# Patient Record
Sex: MALE | Race: AMERICAN INDIAN OR ALASKA NATIVE
[De-identification: names, ages, dates, MRNs, and addresses within clinical notes are randomized per-mention and may not be internally consistent; named-entity substitution may affect disease eponyms.]

---

## 2022-06-23 ENCOUNTER — HOSPITAL ENCOUNTER (EMERGENCY)
Dept: HOSPITAL 5 - ED | Age: 52
Discharge: HOME | End: 2022-06-23
Payer: COMMERCIAL

## 2022-06-23 VITALS — SYSTOLIC BLOOD PRESSURE: 123 MMHG | DIASTOLIC BLOOD PRESSURE: 74 MMHG

## 2022-06-23 DIAGNOSIS — N18.9: ICD-10-CM

## 2022-06-23 DIAGNOSIS — Z79.899: ICD-10-CM

## 2022-06-23 DIAGNOSIS — Z88.1: ICD-10-CM

## 2022-06-23 DIAGNOSIS — R07.9: Primary | ICD-10-CM

## 2022-06-23 DIAGNOSIS — R55: ICD-10-CM

## 2022-06-23 LAB
ALBUMIN SERPL-MCNC: 3.8 G/DL (ref 3.9–5)
ALT SERPL-CCNC: 15 UNITS/L (ref 7–56)
APTT BLD: 29.8 SEC. (ref 24.2–36.6)
BASOPHILS # (AUTO): 0 K/MM3 (ref 0–0.1)
BASOPHILS NFR BLD AUTO: 0.6 % (ref 0–1.8)
BUN SERPL-MCNC: 31 MG/DL (ref 9–20)
BUN/CREAT SERPL: 9 %
CALCIUM SERPL-MCNC: 8.8 MG/DL (ref 8.4–10.2)
EOSINOPHIL # BLD AUTO: 0.1 K/MM3 (ref 0–0.4)
EOSINOPHIL NFR BLD AUTO: 1.7 % (ref 0–4.3)
HCT VFR BLD CALC: 41.1 % (ref 35.5–45.6)
HEMOLYSIS INDEX: 3
HGB BLD-MCNC: 13.7 GM/DL (ref 11.8–15.2)
HYALINE CASTS #/AREA URNS LPF: 3 /LPF
INR PPP: 1.01 (ref 0.87–1.13)
LYMPHOCYTES # BLD AUTO: 1.3 K/MM3 (ref 1.2–5.4)
LYMPHOCYTES NFR BLD AUTO: 17 % (ref 13.4–35)
MCHC RBC AUTO-ENTMCNC: 33 % (ref 32–34)
MCV RBC AUTO: 88 FL (ref 84–94)
MONOCYTES # (AUTO): 0.7 K/MM3 (ref 0–0.8)
MONOCYTES % (AUTO): 8.7 % (ref 0–7.3)
MUCOUS THREADS #/AREA URNS HPF: (no result) /HPF
PH UR STRIP: 5 [PH] (ref 5–7)
PLATELET # BLD: 203 K/MM3 (ref 140–440)
PROT UR STRIP-MCNC: (no result) MG/DL
RBC # BLD AUTO: 4.67 M/MM3 (ref 3.65–5.03)
RBC #/AREA URNS HPF: 8 /HPF (ref 0–6)
UROBILINOGEN UR-MCNC: 0 MG/DL (ref ?–2)
WBC #/AREA URNS HPF: 1 /HPF (ref 0–6)

## 2022-06-23 PROCEDURE — 81001 URINALYSIS AUTO W/SCOPE: CPT

## 2022-06-23 PROCEDURE — 80053 COMPREHEN METABOLIC PANEL: CPT

## 2022-06-23 PROCEDURE — 99284 EMERGENCY DEPT VISIT MOD MDM: CPT

## 2022-06-23 PROCEDURE — 85379 FIBRIN DEGRADATION QUANT: CPT

## 2022-06-23 PROCEDURE — 36415 COLL VENOUS BLD VENIPUNCTURE: CPT

## 2022-06-23 PROCEDURE — 80307 DRUG TEST PRSMV CHEM ANLYZR: CPT

## 2022-06-23 PROCEDURE — 74022 RADEX COMPL AQT ABD SERIES: CPT

## 2022-06-23 PROCEDURE — 84443 ASSAY THYROID STIM HORMONE: CPT

## 2022-06-23 PROCEDURE — 96360 HYDRATION IV INFUSION INIT: CPT

## 2022-06-23 PROCEDURE — 93005 ELECTROCARDIOGRAM TRACING: CPT

## 2022-06-23 PROCEDURE — 85025 COMPLETE CBC W/AUTO DIFF WBC: CPT

## 2022-06-23 PROCEDURE — 85610 PROTHROMBIN TIME: CPT

## 2022-06-23 PROCEDURE — 96361 HYDRATE IV INFUSION ADD-ON: CPT

## 2022-06-23 PROCEDURE — 83690 ASSAY OF LIPASE: CPT

## 2022-06-23 PROCEDURE — 85730 THROMBOPLASTIN TIME PARTIAL: CPT

## 2022-06-23 PROCEDURE — 84484 ASSAY OF TROPONIN QUANT: CPT

## 2022-06-23 NOTE — EMERGENCY DEPARTMENT REPORT
ED Chest Pain HPI





- General


Stated Complaint: Abdominal Pain, hypotension


Time Seen by Provider: 06/23/22 11:45





- History of Present Illness


Initial Comments: 





This is a 55-year-old -American male presents to the emergency department

via EMS from home with complaint of some dizziness/lightheadedness, intermittent

left-sided chest pain, intermittent left-sided abdominal pain.  He denies any 

headache but admits to some blurry vision.  He denies any fever, lower extremity

swelling, vomiting, diarrhea, constipation.  The patient passed out in route 

with EMS.  At the time my examination he is awake, alert, oriented, AAO x3.  He 

has a past medical history of hypertension, diabetes, previous MI but no cardiac

stents, CVA without residual deficits, CHF.  The patient is usually seen at 

[facility] but has not been to this emergency department previously.  He did not take

anything for symptoms prior to presentation.





- Related Data


                                    Allergies











Allergy/AdvReac Type Severity Reaction Status Date / Time


 


Sulfa (Sulfonamide Allergy  Itching Verified 06/23/22 11:55





Antibiotics)     














Heart Score





- HEART Score


History: Slightly suspicious


EKG: Normal


Age: 45-65


Risk factors: 1-2 risk factors


Troponin: < normal limit


HEART Score: 2





- EKG Read Time


Time EKG Completed: 00:00


EKG Read Time: 00:00





- Critical Actions


Critical Actions: 0-3 pts:0.9-1.7%risk of adverse cardiac event.Candidate for 

discharge





ED Review of Systems


ROS: 


Stated complaint: Abdominal Pain, hypotension


Other details as noted in HPI





Comment: All other systems reviewed and negative


Constitutional: denies: chills, fever


Eyes: denies: eye pain, vision change


ENT: denies: ear pain, throat pain


Respiratory: denies: cough, shortness of breath


Cardiovascular: chest pain, syncope.  denies: palpitations


Gastrointestinal: abdominal pain, nausea.  denies: vomiting, diarrhea


Genitourinary: denies: dysuria, discharge


Musculoskeletal: denies: back pain, arthralgia


Skin: denies: rash, lesions


Neurological: other (Dizziness/lightheaded).  denies: numbness, paresthesias





ED Physical Exam





- Other


Other exam information: 





GENERAL: The patient is well-developed well-nourished.


HENT: Normocephalic.  Atraumatic.    Patient has moist mucous membranes.


EYES: Extraocular motions are intact.  


NECK: Supple. Trachea is midline.


CHEST/LUNGS: Clear to auscultation.  There is no respiratory distress noted.


HEART/CARDIOVASCULAR: Regular.  There is no tachycardia.  There is no murmur.


ABDOMEN: Abdomen is soft, nontender.  Patient has normal bowel sounds.  There is

no abdominal distention.


SKIN: Skin is warm and dry. 


NEURO: The patient is awake, alert, and oriented.  The patient is cooperative.  

The patient has no focal neurologic deficits.  Normal speech.  Cranial nerves II

through XII grossly intact.  No facial asymmetry.


MUSCULOSKELETAL: There is no tenderness or deformity.  There is no limitation 

range of motion. 





ED Course


                                   Vital Signs











  06/23/22 06/23/22 06/23/22





  11:46 12:01 12:15


 


Pulse Rate 69 72 70


 


Respiratory 13 14 10 L





Rate   


 


Blood Pressure  81/48 81/48


 


Blood Pressure   





[Left]   


 


O2 Sat by Pulse 96 96 97





Oximetry   














  06/23/22 06/23/22 06/23/22





  12:31 12:45 13:01


 


Pulse Rate 75 69 72


 


Respiratory 19 22 22





Rate   


 


Blood Pressure 81/48 87/54 97/52


 


Blood Pressure   





[Left]   


 


O2 Sat by Pulse 95 98 98





Oximetry   














  06/23/22 06/23/22 06/23/22





  13:15 13:31 13:45


 


Pulse Rate 82 77 75


 


Respiratory 12 24 24





Rate   


 


Blood Pressure 97/52 90/41 90/41


 


Blood Pressure   





[Left]   


 


O2 Sat by Pulse 98 96 95





Oximetry   














  06/23/22 06/23/22 06/23/22





  14:01 14:15 14:31


 


Pulse Rate 74 71 81


 


Respiratory 24 16 21





Rate   


 


Blood Pressure 100/41 100/41 101/54


 


Blood Pressure   





[Left]   


 


O2 Sat by Pulse 95 94 93





Oximetry   














  06/23/22 06/23/22 06/23/22





  14:45 15:01 15:15


 


Pulse Rate 82 76 76


 


Respiratory 18 14 11 L





Rate   


 


Blood Pressure 101/54 97/61 97/61


 


Blood Pressure   





[Left]   


 


O2 Sat by Pulse 100 97 95





Oximetry   














  06/23/22 06/23/22 06/23/22





  15:31 15:45 16:01


 


Pulse Rate 71 68 84


 


Respiratory 17 20 17





Rate   


 


Blood Pressure 97/64 97/64 106/66


 


Blood Pressure   





[Left]   


 


O2 Sat by Pulse 96 95 93





Oximetry   














  06/23/22 06/23/22 06/23/22





  16:15 16:31 16:45


 


Pulse Rate 85 73 80


 


Respiratory 20 16 23





Rate   


 


Blood Pressure 106/66 118/68 118/68


 


Blood Pressure   





[Left]   


 


O2 Sat by Pulse 96 95 





Oximetry   














  06/23/22 06/23/22





  17:01 17:06


 


Pulse Rate 69 69


 


Respiratory 14 16





Rate  


 


Blood Pressure 123/74 


 


Blood Pressure  123/74





[Left]  


 


O2 Sat by Pulse  98





Oximetry  














LEONARDO score





- Leonardo Score


Age > 65: (0) No


Aspirin use within the Past 7 Days: (0) No


3 or more CAD Risk Factors: (0) No


2 or more Angina events in past 24 hrs: (1) Yes


Known CAD with more than 50% Stenosis: (0) No


Elevated Cardiac Markers: (0) No


ST Deviation Greater than 0.5mm: (0) No


LEONARDO Score: 1





ED Medical Decision Making





- Lab Data


Result diagrams: 


                                 06/23/22 12:22





                                 06/23/22 12:21








                                   Lab Results











  06/23/22 06/23/22 06/23/22 Range/Units





  12:21 12:21 12:21 


 


WBC     (4.5-11.0)  K/mm3


 


RBC     (3.65-5.03)  M/mm3


 


Hgb     (11.8-15.2)  gm/dl


 


Hct     (35.5-45.6)  %


 


MCV     (84-94)  fl


 


MCH     (28-32)  pg


 


MCHC     (32-34)  %


 


RDW     (13.2-15.2)  %


 


Plt Count     (140-440)  K/mm3


 


Lymph % (Auto)     (13.4-35.0)  %


 


Mono % (Auto)     (0.0-7.3)  %


 


Eos % (Auto)     (0.0-4.3)  %


 


Baso % (Auto)     (0.0-1.8)  %


 


Lymph # (Auto)     (1.2-5.4)  K/mm3


 


Mono # (Auto)     (0.0-0.8)  K/mm3


 


Eos # (Auto)     (0.0-0.4)  K/mm3


 


Baso # (Auto)     (0.0-0.1)  K/mm3


 


Seg Neutrophils %     (40.0-70.0)  %


 


Seg Neutrophils #     (1.8-7.7)  K/mm3


 


PT  14.4    (12.2-14.9)  Sec.


 


INR  1.01    (0.87-1.13)  


 


APTT  29.8    (24.2-36.6)  Sec.


 


D-Dimer  143.42    (0-234)  ng/mlDDU


 


Sodium   143   (137-145)  mmol/L


 


Potassium   4.7   (3.6-5.0)  mmol/L


 


Chloride   112.7 H   ()  mmol/L


 


Carbon Dioxide   21 L   (22-30)  mmol/L


 


Anion Gap   14   mmol/L


 


BUN   31 H   (9-20)  mg/dL


 


Creatinine   3.3 H   (0.8-1.3)  mg/dL


 


Estimated GFR   20   ml/min


 


BUN/Creatinine Ratio   9   %


 


Glucose   153 H   ()  mg/dL


 


Calcium   8.8   (8.4-10.2)  mg/dL


 


Total Bilirubin   0.80   (0.1-1.2)  mg/dL


 


AST   10   (5-40)  units/L


 


ALT   15   (7-56)  units/L


 


Alkaline Phosphatase   71   ()  units/L


 


Troponin T   < 0.010   (0.00-0.029)  ng/mL


 


Total Protein   5.5 L   (6.3-8.2)  g/dL


 


Albumin   3.8 L   (3.9-5)  g/dL


 


Albumin/Globulin Ratio   2.2   %


 


Lipase   27   (13-60)  units/L


 


TSH    1.350  (0.270-4.200)  mlU/mL


 


Urine Color     (Yellow)  


 


Urine Turbidity     (Clear)  


 


Urine pH     (5.0-7.0)  


 


Ur Specific Gravity     (1.003-1.030)  


 


Urine Protein     (Negative)  mg/dL


 


Urine Glucose (UA)     (Negative)  mg/dL


 


Urine Ketones     (Negative)  mg/dL


 


Urine Blood     (Negative)  


 


Urine Nitrite     (Negative)  


 


Ur Reducing Substances     


 


Urine Bilirubin     (Negative)  


 


Urine Ictotest     


 


Urine Urobilinogen     (<2.0)  mg/dL


 


Ur Leukocyte Esterase     (Negative)  


 


Urine WBC (Auto)     (0.0-6.0)  /HPF


 


Urine RBC (Auto)     (0.0-6.0)  /HPF


 


U Epithel Cells (Auto)     (0-13.0)  /HPF


 


Hyaline Casts     /LPF


 


Urine Mucus     /HPF


 


Urine Opiates Screen     


 


Urine Methadone Screen     


 


Ur Barbiturates Screen     


 


Ur Phencyclidine Scrn     


 


Ur Amphetamines Screen     


 


U Benzodiazepines Scrn     


 


Urine Cocaine Screen     


 


U Marijuana (THC) Screen     


 


Drugs of Abuse Note     














  06/23/22 06/23/22 06/23/22 Range/Units





  12:22 15:22 15:22 


 


WBC  7.9    (4.5-11.0)  K/mm3


 


RBC  4.67    (3.65-5.03)  M/mm3


 


Hgb  13.7    (11.8-15.2)  gm/dl


 


Hct  41.1    (35.5-45.6)  %


 


MCV  88    (84-94)  fl


 


MCH  29    (28-32)  pg


 


MCHC  33    (32-34)  %


 


RDW  15.0    (13.2-15.2)  %


 


Plt Count  203    (140-440)  K/mm3


 


Lymph % (Auto)  17.0    (13.4-35.0)  %


 


Mono % (Auto)  8.7 H    (0.0-7.3)  %


 


Eos % (Auto)  1.7    (0.0-4.3)  %


 


Baso % (Auto)  0.6    (0.0-1.8)  %


 


Lymph # (Auto)  1.3    (1.2-5.4)  K/mm3


 


Mono # (Auto)  0.7    (0.0-0.8)  K/mm3


 


Eos # (Auto)  0.1    (0.0-0.4)  K/mm3


 


Baso # (Auto)  0.0    (0.0-0.1)  K/mm3


 


Seg Neutrophils %  72.0 H    (40.0-70.0)  %


 


Seg Neutrophils #  5.7    (1.8-7.7)  K/mm3


 


PT     (12.2-14.9)  Sec.


 


INR     (0.87-1.13)  


 


APTT     (24.2-36.6)  Sec.


 


D-Dimer     (0-234)  ng/mlDDU


 


Sodium     (137-145)  mmol/L


 


Potassium     (3.6-5.0)  mmol/L


 


Chloride     ()  mmol/L


 


Carbon Dioxide     (22-30)  mmol/L


 


Anion Gap     mmol/L


 


BUN     (9-20)  mg/dL


 


Creatinine     (0.8-1.3)  mg/dL


 


Estimated GFR     ml/min


 


BUN/Creatinine Ratio     %


 


Glucose     ()  mg/dL


 


Calcium     (8.4-10.2)  mg/dL


 


Total Bilirubin     (0.1-1.2)  mg/dL


 


AST     (5-40)  units/L


 


ALT     (7-56)  units/L


 


Alkaline Phosphatase     ()  units/L


 


Troponin T     (0.00-0.029)  ng/mL


 


Total Protein     (6.3-8.2)  g/dL


 


Albumin     (3.9-5)  g/dL


 


Albumin/Globulin Ratio     %


 


Lipase     (13-60)  units/L


 


TSH     (0.270-4.200)  mlU/mL


 


Urine Color   Yellow   (Yellow)  


 


Urine Turbidity   Clear   (Clear)  


 


Urine pH   5.0   (5.0-7.0)  


 


Ur Specific Gravity   1.015   (1.003-1.030)  


 


Urine Protein   <15 mg/dl   (Negative)  mg/dL


 


Urine Glucose (UA)   Negative   (Negative)  mg/dL


 


Urine Ketones   Negative   (Negative)  mg/dL


 


Urine Blood   Negative   (Negative)  


 


Urine Nitrite   Negative   (Negative)  


 


Ur Reducing Substances   Not Reportable   


 


Urine Bilirubin   Negative   (Negative)  


 


Urine Ictotest   Not Reportable   


 


Urine Urobilinogen   0.0   (<2.0)  mg/dL


 


Ur Leukocyte Esterase   Negative   (Negative)  


 


Urine WBC (Auto)   1.0   (0.0-6.0)  /HPF


 


Urine RBC (Auto)   8.0   (0.0-6.0)  /HPF


 


U Epithel Cells (Auto)   5.0   (0-13.0)  /HPF


 


Hyaline Casts   3   /LPF


 


Urine Mucus   Few   /HPF


 


Urine Opiates Screen    Negative  


 


Urine Methadone Screen    Negative  


 


Ur Barbiturates Screen    Negative  


 


Ur Phencyclidine Scrn    Negative  


 


Ur Amphetamines Screen    Negative  


 


U Benzodiazepines Scrn    Negative  


 


Urine Cocaine Screen    Negative  


 


U Marijuana (THC) Screen    Negative  


 


Drugs of Abuse Note    Disclamer  














  06/23/22 Range/Units





  Unknown 


 


WBC   (4.5-11.0)  K/mm3


 


RBC   (3.65-5.03)  M/mm3


 


Hgb   (11.8-15.2)  gm/dl


 


Hct   (35.5-45.6)  %


 


MCV   (84-94)  fl


 


MCH   (28-32)  pg


 


MCHC   (32-34)  %


 


RDW   (13.2-15.2)  %


 


Plt Count   (140-440)  K/mm3


 


Lymph % (Auto)   (13.4-35.0)  %


 


Mono % (Auto)   (0.0-7.3)  %


 


Eos % (Auto)   (0.0-4.3)  %


 


Baso % (Auto)   (0.0-1.8)  %


 


Lymph # (Auto)   (1.2-5.4)  K/mm3


 


Mono # (Auto)   (0.0-0.8)  K/mm3


 


Eos # (Auto)   (0.0-0.4)  K/mm3


 


Baso # (Auto)   (0.0-0.1)  K/mm3


 


Seg Neutrophils %   (40.0-70.0)  %


 


Seg Neutrophils #   (1.8-7.7)  K/mm3


 


PT   (12.2-14.9)  Sec.


 


INR   (0.87-1.13)  


 


APTT   (24.2-36.6)  Sec.


 


D-Dimer   (0-234)  ng/mlDDU


 


Sodium   (137-145)  mmol/L


 


Potassium   (3.6-5.0)  mmol/L


 


Chloride   ()  mmol/L


 


Carbon Dioxide   (22-30)  mmol/L


 


Anion Gap   mmol/L


 


BUN   (9-20)  mg/dL


 


Creatinine   (0.8-1.3)  mg/dL


 


Estimated GFR   ml/min


 


BUN/Creatinine Ratio   %


 


Glucose   ()  mg/dL


 


Calcium   (8.4-10.2)  mg/dL


 


Total Bilirubin   (0.1-1.2)  mg/dL


 


AST   (5-40)  units/L


 


ALT   (7-56)  units/L


 


Alkaline Phosphatase   ()  units/L


 


Troponin T  < 0.010  (0.00-0.029)  ng/mL


 


Total Protein   (6.3-8.2)  g/dL


 


Albumin   (3.9-5)  g/dL


 


Albumin/Globulin Ratio   %


 


Lipase   (13-60)  units/L


 


TSH   (0.270-4.200)  mlU/mL


 


Urine Color   (Yellow)  


 


Urine Turbidity   (Clear)  


 


Urine pH   (5.0-7.0)  


 


Ur Specific Gravity   (1.003-1.030)  


 


Urine Protein   (Negative)  mg/dL


 


Urine Glucose (UA)   (Negative)  mg/dL


 


Urine Ketones   (Negative)  mg/dL


 


Urine Blood   (Negative)  


 


Urine Nitrite   (Negative)  


 


Ur Reducing Substances   


 


Urine Bilirubin   (Negative)  


 


Urine Ictotest   


 


Urine Urobilinogen   (<2.0)  mg/dL


 


Ur Leukocyte Esterase   (Negative)  


 


Urine WBC (Auto)   (0.0-6.0)  /HPF


 


Urine RBC (Auto)   (0.0-6.0)  /HPF


 


U Epithel Cells (Auto)   (0-13.0)  /HPF


 


Hyaline Casts   /LPF


 


Urine Mucus   /HPF


 


Urine Opiates Screen   


 


Urine Methadone Screen   


 


Ur Barbiturates Screen   


 


Ur Phencyclidine Scrn   


 


Ur Amphetamines Screen   


 


U Benzodiazepines Scrn   


 


Urine Cocaine Screen   


 


U Marijuana (THC) Screen   


 


Drugs of Abuse Note   














- EKG Data


-: EKG Interpreted by Me


EKG shows normal: sinus rhythm, axis (Left axis deviation), intervals, QRS 

complexes, ST-T waves (T wave inversions to the lateral leads)


Rate: normal





- EKG Data


When compared to previous EKG there are: previous EKG unavailable


Interpretation: other (Sinus rhythm at 69 bpm, left axis deviation, normal 

intervals, T wave inversions to the lateral leads.  No ST elevation MI.)





- Radiology Data


Radiology results: image reviewed


interpreted by me: 





Chest x-ray does not show any acute process.  There are no pleural effusions, 

obvious pneumonia and there is no pneumothorax.


Abdominal x-ray shows nonspecific nonobstructive bowel gas. No free air.





- Medical Decision Making





This patient presents with some lightheadedness/dizziness, and intermittent 

left-sided chest pain and intermittent abdominal pain.  At the time of my 

initial examination he just complains of some mild lightheadedness and had a 

syncopal episode prior to presentation.  EKG does not show any morphology 

consistent with ST elevation myocardial infarction or any significant 

arrhythmia.  Chest x-ray does not show any pneumonia, pleural effusions, 

pneumothorax, widened mediastinum, or any other acute process.  Abdominal x-ray 

shows nonspecific nonobstructive bowel gas, and no free air.  Labs have been 

mostly unremarkable including CBC, metabolic panel, negative troponins x2, 

negative D-dimer, except for some renal insufficiency with a creatinine of 3.3. 

 The patient says he does have a history of CKD for which she says his kidneys 

function at about 30%.  Patient presented with some borderline hypotension.  He 

was given a total of 1.5 L of IV fluid.  Despite his history of CHF, the patient

 does not appear to have any concern for volume overload.  The blood pressure 

normalized and vital signs have been reassuring.  He was reevaluated multiple 

times over multiple hours and says he is feeling greatly improved and his only 

symptom at this time is some fatigue.  He was seen ambulatory in the emergency 

department and both appears and feels stable.  For all these reasons he appears 

safe for discharge home to follow-up outpatient with his primary care physician,

 nephrologist, and he will return to the ER with any worsening symptoms or with 

any acute distress.


Critical Care Time: No


Critical care attestation.: 


If time is entered above; I have spent that time in minutes in the direct care 

of this critically ill patient, excluding procedure time.








ED Disposition


Clinical Impression: 


 Intermittent chest pain





Syncope


Qualifiers:


 Syncope type: unspecified Qualified Code(s): R55 - Syncope and collapse





CKD (chronic kidney disease)


Qualifiers:


 Chronic kidney disease stage: unspecified stage Qualified Code(s): N18.9 - 

Chronic kidney disease, unspecified





Disposition: 01 HOME / SELF CARE / HOMELESS


Is pt being admited?: No


Condition: Stable


Instructions:  Nonspecific Chest Pain, Adult, Chronic Kidney Disease, Adult, 

Easy-to-Read, Syncope, Syncope (ED)


Additional Instructions: 


Please follow-up with your primary care physician in the next few days.  Please 

follow-up with your cardiologist in the next few days.  Just in case you do not 

have a cardiologist, I am giving you a referral for a local cardiologist, Dr. Vanegas.  Return to the emergency department with any worsening of your 

symptoms or with any acute distress.


Referrals: 


RIRI VANEGAS MD [Staff Physician] - 2-3 Days


Time of Disposition: 15:58

## 2022-06-23 NOTE — XRAY REPORT
XR abd series w cxr 1V



INDICATION / CLINICAL INFORMATION: Abd and Chest pain.



COMPARISON: None available.



FINDINGS:



TUBES / LINES: None.



CHEST: Heart is enlarged. There is no significant pulmonary vasculature congestion. No focal airspace
 consolidation. No pleural effusion. No pneumothorax.



BOWEL GAS PATTERN: Bowel gas pattern is nonobstructive. 

FREE AIR / EXTRALUMINAL GAS: None seen.



ADDITIONAL FINDINGS: No significant additional findings.



IMPRESSION:

No acute radiographic findings of the chest or abdomen.



Signer Name: Deven Lloyd MD 

Signed: 6/23/2022 12:54 PM

Workstation Name: [id number]

## 2022-06-24 NOTE — ELECTROCARDIOGRAPH REPORT
[facility]

                                       

Test Date:    2022               Test Time:    12:39:24

Pat Name:     Joanna Sanders        Department:   

Patient ID:   [id number]          Room:          

Gender:       M                        Technician:   NC

:          1970               Requested By: CHERYL ALBERTO

Order Number: [id number]              Reading MD:   Genna Cadena

                                 Measurements

Intervals                              Axis          

Rate:         69                       P:            50

KY:           190                      QRS:          -5

QRSD:         84                       T:            188

QT:           412                                    

QTc:          441                                    

                           Interpretive Statements

Sinus rhythm

Abnormal T, consider ischemia, diffuse leads

Consider left ventricle hypertrophy with repolarization abnormalities of LVH

No previous ECG available for comparison

Electronically Signed On 2022 13:49:21 EDT by Genna Cadena